# Patient Record
Sex: FEMALE | Race: WHITE | Employment: FULL TIME | URBAN - METROPOLITAN AREA
[De-identification: names, ages, dates, MRNs, and addresses within clinical notes are randomized per-mention and may not be internally consistent; named-entity substitution may affect disease eponyms.]

---

## 2017-02-22 ENCOUNTER — OFFICE VISIT (OUTPATIENT)
Dept: FAMILY MEDICINE | Facility: CLINIC | Age: 47
End: 2017-02-22

## 2017-02-22 VITALS
SYSTOLIC BLOOD PRESSURE: 115 MMHG | HEART RATE: 82 BPM | BODY MASS INDEX: 24.89 KG/M2 | OXYGEN SATURATION: 98 % | WEIGHT: 145 LBS | DIASTOLIC BLOOD PRESSURE: 70 MMHG

## 2017-02-22 DIAGNOSIS — Z76.0 ENCOUNTER FOR MEDICATION REFILL: ICD-10-CM

## 2017-02-22 DIAGNOSIS — L03.019 CHRONIC PARONYCHIA OF FINGER, UNSPECIFIED LATERALITY: Primary | ICD-10-CM

## 2017-02-22 PROCEDURE — 99213 OFFICE O/P EST LOW 20 MIN: CPT | Performed by: FAMILY MEDICINE

## 2017-02-22 RX ORDER — VENLAFAXINE HYDROCHLORIDE 37.5 MG/1
37.5 CAPSULE, EXTENDED RELEASE ORAL DAILY
Qty: 90 CAPSULE | Refills: 3 | Status: SHIPPED | OUTPATIENT
Start: 2017-02-22

## 2017-02-22 RX ORDER — FLUCONAZOLE 100 MG/1
TABLET ORAL
Qty: 11 TABLET | Refills: 0 | Status: SHIPPED | OUTPATIENT
Start: 2017-02-22

## 2017-02-22 NOTE — MR AVS SNAPSHOT
"              After Visit Summary   2017    Erica Ortez    MRN: [id number]           Patient Information     Date Of Birth          1970        Visit Information        Provider Department      2017 9:45 AM Chivo Azul MD [facility]        Today's Diagnoses     Chronic paronychia of finger, unspecified laterality    -  1    Encounter for medication refill           Follow-ups after your visit        Who to contact     If you have questions or need follow up information about today's clinic visit or your schedule please contact [facility] directly at [phone].  Normal or non-critical lab and imaging results will be communicated to you by [employer], letter or phone within 4 business days after the clinic has received the results. If you do not hear from us within 7 days, please contact the clinic through [employer] or phone. If you have a critical or abnormal lab result, we will notify you by phone as soon as possible.  Submit refill requests through [employer] or call your pharmacy and they will forward the refill request to us. Please allow 3 business days for your refill to be completed.          Additional Information About Your Visit        MyChart Information     [employer] lets you send messages to your doctor, view your test results, renew your prescriptions, schedule appointments and more. To sign up, go to [email or web address] . Click on \"Log in\" on the left side of the screen, which will take you to the Welcome page. Then click on \"Sign up Now\" on the right side of the page.     You will be asked to enter the access code listed below, as well as some personal information. Please follow the directions to create your username and password.     Your access code is: [email or web address]  Expires: 2017 10:18 AM     Your access code will  in 90 days. If you need help or a new code, please call your [place] clinic or [phone].        Care EveryWhere ID     " This is your Care EveryWhere ID. This could be used by other organizations to access your [place] medical records  [id number]        Your Vitals Were     Pulse Last Period Pulse Oximetry BMI (Body Mass Index)          82 05/15/2014 98% 24.89 kg/m2         Blood Pressure from Last 3 Encounters:   02/22/17 115/70   11/23/16 122/88   03/11/16 115/68    Weight from Last 3 Encounters:   02/22/17 65.8 kg (145 lb)   11/23/16 63.5 kg (140 lb)   03/11/16 63.5 kg (140 lb)              Today, you had the following     No orders found for display         Today's Medication Changes          These changes are accurate as of: 2/22/17 10:18 AM.  If you have any questions, ask your nurse or doctor.               Start taking these medicines.        Dose/Directions    fluconazole 100 MG tablet   Commonly known as:  DIFLUCAN   Used for:  Chronic paronychia of finger, unspecified laterality   Started by:  Chivo Azul MD        Two to start, then one daily   Quantity:  11 tablet   Refills:  0            Where to get your medicines      These medications were sent to [facility] [address] [place], [place] - [address] [facility] [address]  [address] [facility] [address]     Phone:  [phone]     fluconazole 100 MG tablet    venlafaxine 37.5 MG 24 hr capsule                Primary Care Provider Office Phone # Fax #    Chivo Azul [phone]       [facility] [address] [facility] [address]        Thank you!     Thank you for choosing [facility]  for your care. Our goal is always to provide you with excellent care. Hearing back from our patients is one way we can continue to improve our services. Please take a few minutes to complete the written survey that you may receive in the mail after your visit with us. Thank you!             Your Updated Medication List - Protect others around you: Learn how to safely use, store and throw away your medicines  at www.disposemymeds.org.          This list is accurate as of: 2/22/17 10:18 AM.  Always use your most recent med list.                   Brand Name Dispense Instructions for use    CALCARB 600/D PO      Take 4 tablets by mouth.       fluconazole 100 MG tablet    DIFLUCAN    11 tablet    Two to start, then one daily       ibuprofen 600 MG tablet    ADVIL/MOTRIN    100 tablet    Take 1 tablet (600 mg) by mouth every 6 hours as needed for pain (mild)       IRON SUPPLEMENT PO          MULTI VITAMIN/MINERALS PO      Take 1 tablet by mouth daily       venlafaxine 37.5 MG 24 hr capsule    EFFEXOR-XR    90 capsule    Take 1 capsule (37.5 mg) by mouth daily       vitamin B complex with vitamin C Tabs tablet      Take 1 tablet by mouth daily

## 2017-02-22 NOTE — PROGRESS NOTES
Here for refill of her venlafaxine. No problems. Sleeping is fine.   ROS: paronychia both middle fingers  OBJECTIVE: /70  Pulse 82  Wt 65.8 kg (145 lb)  LMP 05/15/2014  SpO2 98%  BMI 24.89 kg/m2 NAD good mood. Dusky red swollen periungual tissue, no discharge. Nail plate loosening  Good mood.  (L03.019) Chronic paronychia of finger, unspecified laterality  (primary encounter diagnosis)  Comment:   Plan: fluconazole (DIFLUCAN) 100 MG tablet            (Z76.0) Encounter for medication refill  Comment:   Plan: venlafaxine (EFFEXOR-XR) 37.5 MG 24 hr capsule

## 2017-12-03 ENCOUNTER — HEALTH MAINTENANCE LETTER (OUTPATIENT)
Age: 47
End: 2017-12-03